# Patient Record
Sex: MALE | Race: WHITE
[De-identification: names, ages, dates, MRNs, and addresses within clinical notes are randomized per-mention and may not be internally consistent; named-entity substitution may affect disease eponyms.]

---

## 2019-12-27 ENCOUNTER — HOSPITAL ENCOUNTER (EMERGENCY)
Dept: HOSPITAL 62 - ER | Age: 44
LOS: 1 days | Discharge: NURSING, REHAB OR LONG-TERM CARE | End: 2019-12-28
Payer: SELF-PAY

## 2019-12-27 DIAGNOSIS — K70.10: ICD-10-CM

## 2019-12-27 DIAGNOSIS — R25.1: ICD-10-CM

## 2019-12-27 DIAGNOSIS — F10.229: Primary | ICD-10-CM
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
## 2019-12-27 DIAGNOSIS — R11.0: ICD-10-CM

## 2019-12-27 DIAGNOSIS — R45.851: ICD-10-CM

## 2019-12-27 DIAGNOSIS — Z88.8: ICD-10-CM

## 2019-12-27 DIAGNOSIS — Z88.1: ICD-10-CM

## 2019-12-27 LAB
ADD MANUAL DIFF: NO
ALBUMIN SERPL-MCNC: 5 G/DL (ref 3.5–5)
ALP SERPL-CCNC: 76 U/L (ref 38–126)
ANION GAP SERPL CALC-SCNC: 18 MMOL/L (ref 5–19)
APAP SERPL-MCNC: < 10 UG/ML (ref 10–30)
APPEARANCE UR: CLEAR
APTT PPP: YELLOW S
AST SERPL-CCNC: 183 U/L (ref 17–59)
BARBITURATES UR QL SCN: NEGATIVE
BASOPHILS # BLD AUTO: 0 10^3/UL (ref 0–0.2)
BASOPHILS NFR BLD AUTO: 0.6 % (ref 0–2)
BILIRUB DIRECT SERPL-MCNC: 0.3 MG/DL (ref 0–0.4)
BILIRUB SERPL-MCNC: 0.5 MG/DL (ref 0.2–1.3)
BILIRUB UR QL STRIP: NEGATIVE
BUN SERPL-MCNC: 10 MG/DL (ref 7–20)
CALCIUM: 9.8 MG/DL (ref 8.4–10.2)
CHLORIDE SERPL-SCNC: 99 MMOL/L (ref 98–107)
CO2 SERPL-SCNC: 28 MMOL/L (ref 22–30)
EOSINOPHIL # BLD AUTO: 0.2 10^3/UL (ref 0–0.6)
EOSINOPHIL NFR BLD AUTO: 3.7 % (ref 0–6)
ERYTHROCYTE [DISTWIDTH] IN BLOOD BY AUTOMATED COUNT: 14.8 % (ref 11.5–14)
ETHANOL SERPL-MCNC: 413 MG/DL
GLUCOSE SERPL-MCNC: 86 MG/DL (ref 75–110)
GLUCOSE UR STRIP-MCNC: NEGATIVE MG/DL
HCT VFR BLD CALC: 53 % (ref 37.9–51)
HGB BLD-MCNC: 18.2 G/DL (ref 13.5–17)
KETONES UR STRIP-MCNC: (no result) MG/DL
LYMPHOCYTES # BLD AUTO: 1.3 10^3/UL (ref 0.5–4.7)
LYMPHOCYTES NFR BLD AUTO: 25.5 % (ref 13–45)
MCH RBC QN AUTO: 35.2 PG (ref 27–33.4)
MCHC RBC AUTO-ENTMCNC: 34.5 G/DL (ref 32–36)
MCV RBC AUTO: 102 FL (ref 80–97)
METHADONE UR QL SCN: NEGATIVE
MONOCYTES # BLD AUTO: 0.3 10^3/UL (ref 0.1–1.4)
MONOCYTES NFR BLD AUTO: 6 % (ref 3–13)
NEUTROPHILS # BLD AUTO: 3.3 10^3/UL (ref 1.7–8.2)
NEUTS SEG NFR BLD AUTO: 64.2 % (ref 42–78)
NITRITE UR QL STRIP: NEGATIVE
PCP UR QL SCN: NEGATIVE
PH UR STRIP: 6 [PH] (ref 5–9)
PLATELET # BLD: 267 10^3/UL (ref 150–450)
POTASSIUM SERPL-SCNC: 5 MMOL/L (ref 3.6–5)
PROT SERPL-MCNC: 9 G/DL (ref 6.3–8.2)
PROT UR STRIP-MCNC: 100 MG/DL
RBC # BLD AUTO: 5.18 10^6/UL (ref 4.35–5.55)
SALICYLATES SERPL-MCNC: < 1 MG/DL (ref 2–20)
SP GR UR STRIP: 1.01
TOTAL CELLS COUNTED % (AUTO): 100 %
URINE AMPHETAMINES SCREEN: NEGATIVE
URINE BENZODIAZEPINES SCREEN: NEGATIVE
URINE COCAINE SCREEN: NEGATIVE
URINE MARIJUANA (THC) SCREEN: NEGATIVE
UROBILINOGEN UR-MCNC: NEGATIVE MG/DL (ref ?–2)
WBC # BLD AUTO: 5.2 10^3/UL (ref 4–10.5)

## 2019-12-27 PROCEDURE — 93005 ELECTROCARDIOGRAM TRACING: CPT

## 2019-12-27 PROCEDURE — 80053 COMPREHEN METABOLIC PANEL: CPT

## 2019-12-27 PROCEDURE — 85025 COMPLETE CBC W/AUTO DIFF WBC: CPT

## 2019-12-27 PROCEDURE — 36415 COLL VENOUS BLD VENIPUNCTURE: CPT

## 2019-12-27 PROCEDURE — 93010 ELECTROCARDIOGRAM REPORT: CPT

## 2019-12-27 PROCEDURE — 96361 HYDRATE IV INFUSION ADD-ON: CPT

## 2019-12-27 PROCEDURE — 80307 DRUG TEST PRSMV CHEM ANLYZR: CPT

## 2019-12-27 PROCEDURE — 99285 EMERGENCY DEPT VISIT HI MDM: CPT

## 2019-12-27 PROCEDURE — 96365 THER/PROPH/DIAG IV INF INIT: CPT

## 2019-12-27 PROCEDURE — 81001 URINALYSIS AUTO W/SCOPE: CPT

## 2019-12-27 PROCEDURE — S0119 ONDANSETRON 4 MG: HCPCS

## 2019-12-27 RX ADMIN — LORAZEPAM SCH MG: 1 TABLET ORAL at 21:03

## 2019-12-27 NOTE — ER DOCUMENT REPORT
ED General





- General


Chief Complaint: Withdrawal


Stated Complaint: ALCOHOL WITHDRAWEL


Time Seen by Provider: 12/27/19 19:20


TRAVEL OUTSIDE OF THE [place] IN LAST 30 DAYS: No





- HPI


Notes: 





44-year-old male presents requesting alcohol detox.  This gentleman has a 

longstanding history of chronic alcoholism and says he has been admitted to a 

detox facility out of state on 5 prior occasions.  He had, however, remained 

sober for close to 7 years and began drinking again about 6 weeks ago after 

death of his girlfriend.  He has been living with his parents and working as a 

.  He says he has been drinking approximately 1/2 gallon of bourbon or 

vodka per day.  He has been having some intermittent blackouts and has decided 

that it is time to come in for detox.





Patient had tried to "sign himself and" [place] but on breathalyzer was found to 

have alcohol greater than 400 and was advised that he would require medical 

clearance through the ED first.





Patient denies any suicidal/homicidal ideation.  He denies auditory or visual 

hallucinations.  He admits that he used some cocaine about 2 weeks ago but says 

he does not abuse drugs on a regular basis.





Patient denies any history of diabetes, hypertension or cardiac disease.  He 

denies any history of major surgery.








- Related Data


Allergies/Adverse Reactions: 


                                        





bupropion [From Wellbutrin] Allergy (Verified 12/27/19 19:44)


   


cefaclor [From Ceclor] Allergy (Verified 12/27/19 19:44)


   











Past Medical History





- General


Information source: Patient





- Social History


Smoking Status: Never Smoker


Chew tobacco use (# tins/day): No


Frequency of alcohol use: Here for Alcohol Detox


Drug Abuse: None


Family History: Reviewed & Not Pertinent


Patient has suicidal ideation: Yes - No plan currently


Patient has homicidal ideation: No





Review of Systems





- Review of Systems


Notes: 





Constitutional: Negative for fever.


HENT: Negative for sore throat.


Eyes: Negative for visual changes.


Cardiovascular: Negative for chest pain.


Respiratory: Negative for shortness of breath.


Gastrointestinal: Negative for abdominal pain, vomiting or diarrhea.


Genitourinary: Negative for dysuria.


Musculoskeletal: Negative for back pain.


Skin: Negative for rash.


Neurological: Negative for headaches, weakness or numbness.





10 point ROS negative except as marked above and in HPI.








Physical Exam





- Notes


Notes: 











GENERAL: Well-developed well-nourished male approximately stated age appearing 

in no acute distress.  Strong odor of alcohol present.





SKIN: Flushed.  Good turgor no rashes.





HEAD: Normocephalic atraumatic.





EYES: Pupils are equal and sluggish.  Nystagmus on lateral gaze bilaterally.  

Conjunctivae injected.


EARS: CANALS AND TMS CLEAR.





NOSE: CLEAR.





MOUTH: Moist mucosa.  Good dentition.  No stridor or edema.  No drooling.





NECK: Supple.  No masses or thyromegaly.  No adenopathy.  Carotids 2+ without 

bruits.  No JVD.





BACK: Symmetrical without tenderness.





CHEST: Respirations unlabored.  Breath sounds clear and symmetrical.





HEART: Regular rhythm.  No murmur gallop or rub.





ABDOMEN: Soft nontender without masses, organomegaly or rebound.  Bowel sounds 

normally active.  No bruits.





GENITALIA: Deferred.





EXTREMITIES: No edema.  No calf tenderness.  Cap refill less than 1.5 seconds.  

Dorsalis pedis and posterior tibial pulses 3+ and symmetrical.





NEUROLOGICAL: Mildly tremulous.  GCS 14.  Disoriented today.  Alert.  Ataxic 

gait.  Speech is moderately slurred.  Cranial nerves II through XII intact.  

Sensorimotor and cerebellar normal.  Normal tone.





PSYCHIATRIC: Appropriate affect.





Course





- Re-evaluation


Re-evalutation: 





12/28/19 02:17


Patient's blood alcohol was 413.  Urine drug screen is negative.  His chemistry 

profile was remarkable for elevation of transaminases consistent with alcoholic 

hepatitis.  Patient is received a banana bag here.  He has been given some oral 

Ativan as necessary for tremors.  We will hold him until his alcohol is below 

200 and then reevaluate for transfer to detox facility.





- Laboratory


Result Diagrams: 


                                 12/27/19 19:55





                                 12/27/19 19:55


Laboratory results interpreted by me: 


                                        











  12/27/19 12/27/19 12/27/19





  19:55 19:55 19:55


 


Hgb  18.2 H  


 


Hct  53.0 H  


 


MCV  102 H  


 


MCH  35.2 H  


 


RDW  14.8 H  


 


AST   183 H 


 


Total Protein   9.0 H 


 


Urine Protein    100 H


 


Urine Ketones    TRACE H


 


Urine Blood    SMALL H


 


Salicylates   < 1.0 L 


 


Acetaminophen   < 10 L 


 


Serum Alcohol   413 H* 














Discharge





- Discharge


Clinical Impression: 


 Chronic alcoholism





Acute alcohol intoxication


Qualifiers:


 Complication of substance-induced condition: with unspecified complication 

Qualified Code(s): F10.929 - Alcohol use, unspecified with intoxication, 

unspecified





Alcoholic hepatitis


Qualifiers:


 Ascites presence: without ascites Qualified Code(s): K70.10 - Alcoholic 

hepatitis without ascites





Condition: Fair


Disposition: REHAB FACILITY

## 2019-12-27 NOTE — ER DOCUMENT REPORT
ED Medical Screen (RME)





- General


Chief Complaint: Medical Clearance


Stated Complaint: ALCOHOL WITHDRAWEL


Time Seen by Provider: 12/27/19 19:20





- HPI


Notes: 





12/27/19 19:31


Patient is a 44-year-old male with a history of alcohol abuse who presents with 

family requesting medical clearance to be able to go to [place] for detox.  They 

went there initially, but his alcohol was 440 so they sent him here for 

evaluation.  Last alcohol intake was about an hour and a half ago.  He states 

that he drinks about half a gallon of liquor daily.  He has otherwise been 

feeling well.  He has been able to eat and drink without difficulty.  Denies 

drug allergies.  No fever, chest pain, abdominal pain.





I have treated and performed a rapid initial assessment of this patient.  A 

comprehensive ED assessment and evaluation of the patient, analysis of test 

results and completion of medical decision making process will be conducted by 

additional ED providers.





PHYSICAL EXAMINATION:





GENERAL: Well-appearing, well-nourished and in no acute distress.  A&Ox2.  

Answers questions appropriately.  Intoxicated.


Lungs: CTAB


Heart: RRR





- Related Data


Allergies/Adverse Reactions: 


                                        





No Known Allergies Allergy (Verified 12/27/19 19:24)

## 2019-12-28 VITALS — SYSTOLIC BLOOD PRESSURE: 152 MMHG | DIASTOLIC BLOOD PRESSURE: 88 MMHG

## 2019-12-28 RX ADMIN — LORAZEPAM SCH MG: 1 TABLET ORAL at 01:16

## 2019-12-28 RX ADMIN — LORAZEPAM SCH MG: 1 TABLET ORAL at 05:32

## 2020-07-06 ENCOUNTER — HOSPITAL ENCOUNTER (EMERGENCY)
Dept: HOSPITAL 62 - ER | Age: 45
Discharge: HOME | End: 2020-07-06
Payer: SELF-PAY

## 2020-07-06 VITALS — SYSTOLIC BLOOD PRESSURE: 147 MMHG | DIASTOLIC BLOOD PRESSURE: 87 MMHG

## 2020-07-06 DIAGNOSIS — R11.2: ICD-10-CM

## 2020-07-06 DIAGNOSIS — F10.230: Primary | ICD-10-CM

## 2020-07-06 DIAGNOSIS — K70.10: ICD-10-CM

## 2020-07-06 LAB
ADD MANUAL DIFF: NO
ALBUMIN SERPL-MCNC: 5.1 G/DL (ref 3.5–5)
ALP SERPL-CCNC: 80 U/L (ref 38–126)
ANION GAP SERPL CALC-SCNC: 14 MMOL/L (ref 5–19)
APAP SERPL-MCNC: < 10 UG/ML (ref 10–30)
APPEARANCE UR: CLEAR
APTT PPP: YELLOW S
AST SERPL-CCNC: 203 U/L (ref 17–59)
BARBITURATES UR QL SCN: NEGATIVE
BASOPHILS # BLD AUTO: 0 10^3/UL (ref 0–0.2)
BASOPHILS NFR BLD AUTO: 0.3 % (ref 0–2)
BILIRUB DIRECT SERPL-MCNC: 0.1 MG/DL (ref 0–0.4)
BILIRUB SERPL-MCNC: 0.9 MG/DL (ref 0.2–1.3)
BILIRUB UR QL STRIP: NEGATIVE
BUN SERPL-MCNC: 10 MG/DL (ref 7–20)
CALCIUM: 9.5 MG/DL (ref 8.4–10.2)
CHLORIDE SERPL-SCNC: 102 MMOL/L (ref 98–107)
CO2 SERPL-SCNC: 24 MMOL/L (ref 22–30)
EOSINOPHIL # BLD AUTO: 0 10^3/UL (ref 0–0.6)
EOSINOPHIL NFR BLD AUTO: 0.5 % (ref 0–6)
ERYTHROCYTE [DISTWIDTH] IN BLOOD BY AUTOMATED COUNT: 16.7 % (ref 11.5–14)
ETHANOL SERPL-MCNC: 172 MG/DL
GLUCOSE SERPL-MCNC: 92 MG/DL (ref 75–110)
GLUCOSE UR STRIP-MCNC: NEGATIVE MG/DL
HCT VFR BLD CALC: 50.6 % (ref 37.9–51)
HGB BLD-MCNC: 17.5 G/DL (ref 13.5–17)
KETONES UR STRIP-MCNC: 20 MG/DL
LYMPHOCYTES # BLD AUTO: 0.7 10^3/UL (ref 0.5–4.7)
LYMPHOCYTES NFR BLD AUTO: 8.6 % (ref 13–45)
MCH RBC QN AUTO: 34.1 PG (ref 27–33.4)
MCHC RBC AUTO-ENTMCNC: 34.6 G/DL (ref 32–36)
MCV RBC AUTO: 99 FL (ref 80–97)
METHADONE UR QL SCN: NEGATIVE
MONOCYTES # BLD AUTO: 0.4 10^3/UL (ref 0.1–1.4)
MONOCYTES NFR BLD AUTO: 5.5 % (ref 3–13)
NEUTROPHILS # BLD AUTO: 6.9 10^3/UL (ref 1.7–8.2)
NEUTS SEG NFR BLD AUTO: 85.1 % (ref 42–78)
NITRITE UR QL STRIP: NEGATIVE
PCP UR QL SCN: NEGATIVE
PH UR STRIP: 6 [PH] (ref 5–9)
PLATELET # BLD: 237 10^3/UL (ref 150–450)
POTASSIUM SERPL-SCNC: 4.6 MMOL/L (ref 3.6–5)
PROT SERPL-MCNC: 9.1 G/DL (ref 6.3–8.2)
PROT UR STRIP-MCNC: 100 MG/DL
RBC # BLD AUTO: 5.13 10^6/UL (ref 4.35–5.55)
SALICYLATES SERPL-MCNC: < 1 MG/DL (ref 2–20)
SP GR UR STRIP: 1.02
TOTAL CELLS COUNTED % (AUTO): 100 %
URINE AMPHETAMINES SCREEN: NEGATIVE
URINE BENZODIAZEPINES SCREEN: NEGATIVE
URINE COCAINE SCREEN: NEGATIVE
URINE MARIJUANA (THC) SCREEN: NEGATIVE
UROBILINOGEN UR-MCNC: NEGATIVE MG/DL (ref ?–2)
WBC # BLD AUTO: 8.1 10^3/UL (ref 4–10.5)

## 2020-07-06 PROCEDURE — 81001 URINALYSIS AUTO W/SCOPE: CPT

## 2020-07-06 PROCEDURE — 80053 COMPREHEN METABOLIC PANEL: CPT

## 2020-07-06 PROCEDURE — 80307 DRUG TEST PRSMV CHEM ANLYZR: CPT

## 2020-07-06 PROCEDURE — 99285 EMERGENCY DEPT VISIT HI MDM: CPT

## 2020-07-06 PROCEDURE — 96376 TX/PRO/DX INJ SAME DRUG ADON: CPT

## 2020-07-06 PROCEDURE — 93005 ELECTROCARDIOGRAM TRACING: CPT

## 2020-07-06 PROCEDURE — 96374 THER/PROPH/DIAG INJ IV PUSH: CPT

## 2020-07-06 PROCEDURE — 85025 COMPLETE CBC W/AUTO DIFF WBC: CPT

## 2020-07-06 PROCEDURE — 36415 COLL VENOUS BLD VENIPUNCTURE: CPT

## 2020-07-06 PROCEDURE — 96361 HYDRATE IV INFUSION ADD-ON: CPT

## 2020-07-06 PROCEDURE — 96372 THER/PROPH/DIAG INJ SC/IM: CPT

## 2020-07-06 PROCEDURE — 93010 ELECTROCARDIOGRAM REPORT: CPT

## 2020-07-06 RX ADMIN — SODIUM CHLORIDE PRN MLS/HR: 9 INJECTION, SOLUTION INTRAVENOUS at 17:42

## 2020-07-06 RX ADMIN — SODIUM CHLORIDE PRN MLS/HR: 9 INJECTION, SOLUTION INTRAVENOUS at 16:21

## 2020-07-06 NOTE — ER DOCUMENT REPORT
ED Medical Screen (RME)





- General


Chief Complaint: Alcohol Withdrawl


Stated Complaint: POSSIBLE ALCOHOL WITHDRAWAL


Time Seen by Provider: 07/06/20 14:34


Mode of Arrival: Wheelchair


Information source: Patient


Notes: 





44-year-old male presented to ED for complaint of alcohol withdrawal.  He states

he went to [facility] this morning and they sent him to [facility].  When he got 

[facility] they told him he was too drunk and sent him to the ER.  He states he had at 

least the fifth of vodka last night he does not know how much.  States he went 

to the [facility] this morning and has been there all morning.  He states he had 

been sober for about 7 years and follow-up the [facility] about a month or 2 ago and 

has been drinking heavily since then.  He is alert and oriented staggering when 

attempts to walk.

















I have greeted and performed a rapid initial assessment of this patient.  A 

comprehensive ED assessment and evaluation of the patient, analysis of test 

results and completion of medical decision making process will be conducted by 

an additional ED providers.


TRAVEL OUTSIDE OF THE [place] IN LAST 30 DAYS: No





- Related Data


Allergies/Adverse Reactions: 


                                        





bupropion [From Wellbutrin] Allergy (Verified 12/27/19 19:44)


   


cefaclor [From Ceclor] Allergy (Verified 12/27/19 19:44)


   











Past Medical History





- Social History


Chew tobacco use (# tins/day): Yes





Physical Exam





- Vital signs


Vitals: 





                                        











Temp Pulse Resp BP Pulse Ox


 


 98.8 F   135 H  18   147/116 H  97 


 


 07/06/20 14:06  07/06/20 14:06  07/06/20 14:06  07/06/20 14:06  07/06/20 14:06














Course





- Vital Signs


Vital signs: 





                                        











Temp Pulse Resp BP Pulse Ox


 


 98.8 F   135 H  18   147/116 H  97 


 


 07/06/20 14:06  07/06/20 14:06  07/06/20 14:06  07/06/20 14:06  07/06/20 14:06

## 2020-07-06 NOTE — EKG REPORT
SEVERITY:- NORMAL ECG -

SINUS RHYTHM

NONSPECIFIC ST-T CHANGES LATERAL LEADS.

:

Confirmed by: Zachary Armendariz MD 06-Jul-2020 17:52:11

## 2020-07-06 NOTE — PSYCHOLOGICAL NOTE
Psych Note





- Psych Note


Date seen by psych provider: 07/06/20


Psych Note: 





Patient has a confirmed bed at [facility]; confirmed by Arline. 

He was sent to [facility] ED by NICOLE for medical clearance.  They request a nurse to 

nurse prior to the patient's return to their facility- [phone]